# Patient Record
Sex: FEMALE | Race: OTHER | Employment: FULL TIME | URBAN - METROPOLITAN AREA
[De-identification: names, ages, dates, MRNs, and addresses within clinical notes are randomized per-mention and may not be internally consistent; named-entity substitution may affect disease eponyms.]

---

## 2020-01-27 ENCOUNTER — OFFICE VISIT (OUTPATIENT)
Dept: INTERNAL MEDICINE | Facility: CLINIC | Age: 45
End: 2020-01-27
Payer: COMMERCIAL

## 2020-01-27 VITALS
HEIGHT: 65 IN | WEIGHT: 135.13 LBS | SYSTOLIC BLOOD PRESSURE: 110 MMHG | HEART RATE: 74 BPM | BODY MASS INDEX: 22.51 KG/M2 | TEMPERATURE: 98 F | DIASTOLIC BLOOD PRESSURE: 70 MMHG

## 2020-01-27 DIAGNOSIS — Z11.3 SCREEN FOR STD (SEXUALLY TRANSMITTED DISEASE): ICD-10-CM

## 2020-01-27 DIAGNOSIS — Z29.9 PREVENTIVE MEASURE: ICD-10-CM

## 2020-01-27 DIAGNOSIS — N75.0 BARTHOLIN'S CYST: Primary | ICD-10-CM

## 2020-01-27 PROCEDURE — 99999 PR PBB SHADOW E&M-NEW PATIENT-LVL III: CPT | Mod: PBBFAC,,, | Performed by: FAMILY MEDICINE

## 2020-01-27 PROCEDURE — 3008F PR BODY MASS INDEX (BMI) DOCUMENTED: ICD-10-PCS | Mod: CPTII,S$GLB,, | Performed by: FAMILY MEDICINE

## 2020-01-27 PROCEDURE — 3008F BODY MASS INDEX DOCD: CPT | Mod: CPTII,S$GLB,, | Performed by: FAMILY MEDICINE

## 2020-01-27 PROCEDURE — 99999 PR PBB SHADOW E&M-NEW PATIENT-LVL III: ICD-10-PCS | Mod: PBBFAC,,, | Performed by: FAMILY MEDICINE

## 2020-01-27 PROCEDURE — 99203 PR OFFICE/OUTPT VISIT, NEW, LEVL III, 30-44 MIN: ICD-10-PCS | Mod: S$GLB,,, | Performed by: FAMILY MEDICINE

## 2020-01-27 PROCEDURE — 87491 CHLMYD TRACH DNA AMP PROBE: CPT

## 2020-01-27 PROCEDURE — 99203 OFFICE O/P NEW LOW 30 MIN: CPT | Mod: S$GLB,,, | Performed by: FAMILY MEDICINE

## 2020-01-27 RX ORDER — HYDROCODONE BITARTRATE AND ACETAMINOPHEN 5; 325 MG/1; MG/1
1 TABLET ORAL EVERY 4 HOURS PRN
COMMUNITY
Start: 2018-12-02 | End: 2020-06-29

## 2020-01-28 LAB
C TRACH DNA SPEC QL NAA+PROBE: NOT DETECTED
N GONORRHOEA DNA SPEC QL NAA+PROBE: NOT DETECTED

## 2020-01-29 NOTE — PROGRESS NOTES
"Subjective:      Patient ID: Dianna Tim is a 44 y.o. female.    Chief Complaint: Establish Care and Cyst    HPI  43 yo pleasant female with hx of HSV infection here to establish and says she has L sided bartholins cyst she thinks needs drained.   Had it drained about 6-7 yrs ago.   Lost insurance for a good while.  Has had swelling for a long time.  No pain at this time.  No redness/warmth.  Is concerned about it.  Not sexually active but feels she should get screened.    Past Medical History:   Diagnosis Date    HSV infection      Family History   Problem Relation Age of Onset    Diabetes Mother     Hypertension Father      Past Surgical History:   Procedure Laterality Date    EXTRACTION OF TOOTH       Social History     Tobacco Use    Smoking status: Never Smoker    Smokeless tobacco: Never Used   Substance Use Topics    Alcohol use: Never     Frequency: Never    Drug use: Never       /70   Pulse 74   Temp 98.1 °F (36.7 °C) (Oral)   Ht 5' 5" (1.651 m) Comment: per pt. unable to check ht due to hairstyle  Wt 61.3 kg (135 lb 2.3 oz)   BMI 22.49 kg/m²     Review of Systems   Constitutional: Negative for chills and fever.   Genitourinary: Negative for difficulty urinating, pelvic pain, vaginal discharge and vaginal pain.       Objective:     Physical Exam   Constitutional: She appears well-developed and well-nourished.   Cardiovascular: Normal rate, regular rhythm and normal heart sounds.   Pulmonary/Chest: Effort normal and breath sounds normal. No stridor. No respiratory distress.   Genitourinary:         Genitourinary Comments: Left labia with enlargement.  Non-tender/no redness/warmth.  No drainage   Nursing note and vitals reviewed.      No results found for: WBC, HGB, HCT, PLT, CHOL, TRIG, HDL, LDLDIRECT, ALT, AST, NA, K, CL, CREATININE, BUN, CO2, TSH, PSA, INR, GLUF, HGBA1C, MICROALBUR    Assessment:     1. Bartholin's cyst    2. Screen for STD (sexually transmitted disease)    3. " Preventive measure         Plan:     Bartholin's cyst  -     Ambulatory referral to Gynecology    Screen for STD (sexually transmitted disease)  -     HIV 1/2 Ag/Ab (4th Gen); Future; Expected date: 02/27/2020  -     C. trachomatis/N. gonorrhoeae by AMP DNA; Future; Expected date: 02/27/2020  -     Hepatitis panel, acute; Future; Expected date: 02/27/2020  -     RPR; Future; Expected date: 02/27/2020    Preventive measure  -     CBC auto differential; Future; Expected date: 02/27/2020  -     Comprehensive metabolic panel; Future; Expected date: 02/27/2020  -     Lipid panel; Future; Expected date: 02/27/2020  -     Hemoglobin A1c; Future; Expected date: 02/27/2020  -     TSH; Future; Expected date: 02/27/2020  -     Vitamin D; Future; Expected date: 02/27/2020    Send to Gyn for further eval and tx.  No symptomatic//no need for Abx.  Can try warm soaks//has not been working up to now.  Screening for STD/preventive labs  F/u for annual in 1 mos or so

## 2020-03-16 ENCOUNTER — OFFICE VISIT (OUTPATIENT)
Dept: URGENT CARE | Facility: CLINIC | Age: 45
End: 2020-03-16
Payer: COMMERCIAL

## 2020-03-16 VITALS
SYSTOLIC BLOOD PRESSURE: 114 MMHG | BODY MASS INDEX: 23.5 KG/M2 | HEART RATE: 86 BPM | WEIGHT: 141.06 LBS | TEMPERATURE: 98 F | DIASTOLIC BLOOD PRESSURE: 61 MMHG | OXYGEN SATURATION: 100 % | HEIGHT: 65 IN

## 2020-03-16 DIAGNOSIS — J06.9 URI WITH COUGH AND CONGESTION: Primary | ICD-10-CM

## 2020-03-16 PROCEDURE — 96372 THER/PROPH/DIAG INJ SC/IM: CPT | Mod: S$GLB,,, | Performed by: FAMILY MEDICINE

## 2020-03-16 PROCEDURE — 96372 PR INJECTION,THERAP/PROPH/DIAG2ST, IM OR SUBCUT: ICD-10-PCS | Mod: S$GLB,,, | Performed by: FAMILY MEDICINE

## 2020-03-16 PROCEDURE — 99214 PR OFFICE/OUTPT VISIT, EST, LEVL IV, 30-39 MIN: ICD-10-PCS | Mod: 25,S$GLB,, | Performed by: NURSE PRACTITIONER

## 2020-03-16 PROCEDURE — 99214 OFFICE O/P EST MOD 30 MIN: CPT | Mod: 25,S$GLB,, | Performed by: NURSE PRACTITIONER

## 2020-03-16 RX ORDER — BROMPHENIRAMINE MALEATE, PSEUDOEPHEDRINE HYDROCHLORIDE, AND DEXTROMETHORPHAN HYDROBROMIDE 2; 30; 10 MG/5ML; MG/5ML; MG/5ML
10 SYRUP ORAL EVERY 6 HOURS PRN
Qty: 118 ML | Refills: 0 | Status: SHIPPED | OUTPATIENT
Start: 2020-03-16 | End: 2020-03-26

## 2020-03-16 RX ORDER — BETAMETHASONE SODIUM PHOSPHATE AND BETAMETHASONE ACETATE 3; 3 MG/ML; MG/ML
6 INJECTION, SUSPENSION INTRA-ARTICULAR; INTRALESIONAL; INTRAMUSCULAR; SOFT TISSUE
Status: COMPLETED | OUTPATIENT
Start: 2020-03-16 | End: 2020-03-16

## 2020-03-16 RX ADMIN — BETAMETHASONE SODIUM PHOSPHATE AND BETAMETHASONE ACETATE 6 MG: 3; 3 INJECTION, SUSPENSION INTRA-ARTICULAR; INTRALESIONAL; INTRAMUSCULAR; SOFT TISSUE at 11:03

## 2020-03-16 NOTE — PATIENT INSTRUCTIONS
· Your symptoms are likely due to a viral infection. These infections can last up to 14 days, but you should notice some improvement of your symptoms within the first 7-10 days. Viral infections will not improve with antibiotics. If your symptoms persist >10 days without improvement or if you have any new or worsening symptoms, this is an indication that you may have developed a bacterial infection and should return to your primary care provider or to Urgent Care.   · Getting plenty of rest is very important to fighting infections.  · Increase fluids.   · May apply warm compresses as needed for facial pain and congestion.   · Saline nasal spray to loosen nasal congestion.  · Flonase or Nasacort to reduce inflammation in the sinus cavities.  · You may take an over the counter antihistamine such as Claritin or Zyrtec for allergy symptoms such as sneezing, itchy/watery eyes, scratchy throat, or congestion.  · Warm salt water gargles, Cepacol throat lozenges, or Chloraseptic spray for sore throat.  · Take Tylenol or Ibuprofen as needed for sore throat, body aches, or fever.  · Don't drive, drink alcohol, or take any other medication or substance that causes sedation while taking cough syrup.   · Follow up with your primary care provider if symptoms persist >10 days or sooner for any new or worsening symptoms.   · Go to the ER for any fever that does not improve with Tylenol/Ibuprofen, neck stiffness, rash, severe headache, vision changes, shortness of breath, chest pain, facial swelling, severe facial pain, or any other new and concerning symptoms.

## 2020-03-16 NOTE — PROGRESS NOTES
"Subjective:       Patient ID: Dianna Tim is a 44 y.o. female.    Vitals:  height is 5' 5" (1.651 m) and weight is 64 kg (141 lb 0.8 oz). Her tympanic temperature is 98.3 °F (36.8 °C). Her blood pressure is 114/61 and her pulse is 86. Her oxygen saturation is 100%.     Chief Complaint: Sinus Problem    Patient states she has had symptoms for about 5 days and is just ready "to be done with this". 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 Denies fever, chills, body aches and shortness of breath.    Sinus Problem   This is a new problem. The current episode started in the past 7 days. The problem is unchanged. There has been no fever. Her pain is at a severity of 0/10. She is experiencing no pain. Associated symptoms include congestion (nasal), coughing (mostly dry, occasional sputum), sinus pressure and sneezing. Pertinent negatives include no chills, diaphoresis, ear pain, headaches, hoarse voice, shortness of breath or sore throat. Past treatments include nothing. The treatment provided no relief.       Constitution: Negative for chills, sweating, fatigue and fever.   HENT: Positive for congestion (nasal), postnasal drip and sinus pressure. Negative for ear pain, sinus pain, sore throat and voice change.    Neck: Negative for painful lymph nodes.   Eyes: Negative for eye redness.   Respiratory: Positive for cough (mostly dry, occasional sputum). Negative for chest tightness, sputum production, bloody sputum, COPD, shortness of breath, stridor, wheezing and asthma.    Gastrointestinal: Negative for nausea and vomiting.   Musculoskeletal: Negative for muscle ache.   Skin: Negative for rash.   Allergic/Immunologic: Positive for sneezing. Negative for seasonal allergies and asthma.   Neurological: Negative for headaches.   Hematologic/Lymphatic: Negative for swollen lymph nodes.       Objective:      Physical Exam   Constitutional: She is oriented to person, place, and " time. She appears well-developed and well-nourished. She is cooperative.  Non-toxic appearance. She does not have a sickly appearance. She does not appear ill. No distress.   HENT:   Head: Normocephalic and atraumatic.   Right Ear: Hearing, external ear and ear canal normal. Tympanic membrane is not erythematous. A middle ear effusion is present.   Left Ear: Hearing, external ear and ear canal normal. Tympanic membrane is not erythematous. A middle ear effusion is present.   Nose: Rhinorrhea present. No mucosal edema or nasal deformity. No epistaxis. Right sinus exhibits no maxillary sinus tenderness and no frontal sinus tenderness. Left sinus exhibits no maxillary sinus tenderness and no frontal sinus tenderness.   Mouth/Throat: Uvula is midline, oropharynx is clear and moist and mucous membranes are normal. No trismus in the jaw. Normal dentition. No uvula swelling. Cobblestoning present. No oropharyngeal exudate, posterior oropharyngeal edema or posterior oropharyngeal erythema.   Eyes: Conjunctivae and lids are normal. No scleral icterus.   Neck: Trachea normal, full passive range of motion without pain and phonation normal. Neck supple. No neck rigidity. No edema and no erythema present.   Cardiovascular: Normal rate, regular rhythm, normal heart sounds, intact distal pulses and normal pulses.   Pulmonary/Chest: Effort normal and breath sounds normal. No respiratory distress. She has no decreased breath sounds. She has no rhonchi.   Abdominal: Normal appearance.   Musculoskeletal: Normal range of motion. She exhibits no edema or deformity.   Neurological: She is alert and oriented to person, place, and time. She exhibits normal muscle tone. Coordination normal.   Skin: Skin is warm, dry, intact, not diaphoretic and not pale.   Psychiatric: She has a normal mood and affect. Her speech is normal and behavior is normal. Judgment and thought content normal. Cognition and memory are normal.   Nursing note and vitals  reviewed.        Assessment:       1. URI with cough and congestion        Plan:         URI with cough and congestion  -     betamethasone acetate-betamethasone sodium phosphate injection 6 mg  -     brompheniramine-pseudoeph-DM (BROMFED DM) 2-30-10 mg/5 mL Syrp; Take 10 mLs by mouth every 6 (six) hours as needed (cough and congestion).  Dispense: 118 mL; Refill: 0         Pt requests a steroid injection. He has had them in the past (last was at least 1 year ago) with good relief of symptoms and no adverse effects. Offered course of oral steroids in place of injection due to safer side effect profile and ability to discontinue medication if any adverse effects are noted, however, patient prefers injection. Patient informed of potential side effects of steroid injection including elevating blood pressure, increased blood glucose levels, increased risk of opportunistic infections, peptic ulcer disease and GI bleeding, insomnia, tremors, suppression of ones own steroid production, avascular necrosis, osteoporosis, increased intraocular pressure, etc. Patient verbalizes risk/benefit profile and still requests steroid injection.     · Your symptoms are likely due to a viral infection. These infections can last up to 14 days, but you should notice some improvement of your symptoms within the first 7-10 days. Viral infections will not improve with antibiotics. If your symptoms persist >10 days without improvement or if you have any new or worsening symptoms, this is an indication that you may have developed a bacterial infection and should return to your primary care provider or to Urgent Care.   · Getting plenty of rest is very important to fighting infections.  · Increase fluids.   · May apply warm compresses as needed for facial pain and congestion.   · Saline nasal spray to loosen nasal congestion.  · Flonase or Nasacort to reduce inflammation in the sinus cavities.  · You may take an over the counter antihistamine  such as Claritin or Zyrtec for allergy symptoms such as sneezing, itchy/watery eyes, scratchy throat, or congestion.  · Warm salt water gargles, Cepacol throat lozenges, or Chloraseptic spray for sore throat.  · Take Tylenol or Ibuprofen as needed for sore throat, body aches, or fever.  · Don't drive, drink alcohol, or take any other medication or substance that causes sedation while taking cough syrup.   · Follow up with your primary care provider if symptoms persist >10 days or sooner for any new or worsening symptoms.   · Go to the ER for any fever that does not improve with Tylenol/Ibuprofen, neck stiffness, rash, severe headache, vision changes, shortness of breath, chest pain, facial swelling, severe facial pain, or any other new and concerning symptoms.

## 2020-03-18 ENCOUNTER — TELEPHONE (OUTPATIENT)
Dept: URGENT CARE | Facility: CLINIC | Age: 45
End: 2020-03-18

## 2020-06-22 ENCOUNTER — OFFICE VISIT (OUTPATIENT)
Dept: OBSTETRICS AND GYNECOLOGY | Facility: CLINIC | Age: 45
End: 2020-06-22
Payer: COMMERCIAL

## 2020-06-22 ENCOUNTER — OFFICE VISIT (OUTPATIENT)
Dept: URGENT CARE | Facility: CLINIC | Age: 45
End: 2020-06-22
Payer: COMMERCIAL

## 2020-06-22 VITALS
BODY MASS INDEX: 26.93 KG/M2 | SYSTOLIC BLOOD PRESSURE: 130 MMHG | DIASTOLIC BLOOD PRESSURE: 86 MMHG | HEIGHT: 65 IN | WEIGHT: 161.63 LBS

## 2020-06-22 VITALS
TEMPERATURE: 98 F | DIASTOLIC BLOOD PRESSURE: 70 MMHG | HEART RATE: 85 BPM | SYSTOLIC BLOOD PRESSURE: 115 MMHG | WEIGHT: 135 LBS | BODY MASS INDEX: 22.47 KG/M2

## 2020-06-22 DIAGNOSIS — N94.89 LABIAL PAIN: ICD-10-CM

## 2020-06-22 DIAGNOSIS — N75.0 BARTHOLIN CYST: Primary | ICD-10-CM

## 2020-06-22 DIAGNOSIS — N75.0 BARTHOLIN'S CYST: Primary | ICD-10-CM

## 2020-06-22 LAB
BILIRUB UR QL STRIP: NEGATIVE
GLUCOSE UR QL STRIP: NEGATIVE
KETONES UR QL STRIP: NEGATIVE
LEUKOCYTE ESTERASE UR QL STRIP: POSITIVE
PH, POC UA: 5
POC BLOOD, URINE: NEGATIVE
POC NITRATES, URINE: NEGATIVE
PROT UR QL STRIP: NEGATIVE
SP GR UR STRIP: 1 (ref 1–1.03)
UROBILINOGEN UR STRIP-ACNC: NORMAL (ref 0.1–1.1)

## 2020-06-22 PROCEDURE — 81003 POCT URINALYSIS, DIPSTICK, AUTOMATED, W/O SCOPE: ICD-10-PCS | Mod: QW,S$GLB,, | Performed by: NURSE PRACTITIONER

## 2020-06-22 PROCEDURE — 56420 PR I&D BARTHOLIN GLAND ABSCESS: ICD-10-PCS | Mod: S$GLB,,, | Performed by: OBSTETRICS & GYNECOLOGY

## 2020-06-22 PROCEDURE — 99999 PR PBB SHADOW E&M-EST. PATIENT-LVL III: ICD-10-PCS | Mod: PBBFAC,,, | Performed by: OBSTETRICS & GYNECOLOGY

## 2020-06-22 PROCEDURE — 3008F BODY MASS INDEX DOCD: CPT | Mod: CPTII,S$GLB,, | Performed by: OBSTETRICS & GYNECOLOGY

## 2020-06-22 PROCEDURE — 99999 PR PBB SHADOW E&M-EST. PATIENT-LVL III: CPT | Mod: PBBFAC,,, | Performed by: OBSTETRICS & GYNECOLOGY

## 2020-06-22 PROCEDURE — 99203 OFFICE O/P NEW LOW 30 MIN: CPT | Mod: 25,S$GLB,, | Performed by: OBSTETRICS & GYNECOLOGY

## 2020-06-22 PROCEDURE — 3008F PR BODY MASS INDEX (BMI) DOCUMENTED: ICD-10-PCS | Mod: CPTII,S$GLB,, | Performed by: OBSTETRICS & GYNECOLOGY

## 2020-06-22 PROCEDURE — 99214 PR OFFICE/OUTPT VISIT, EST, LEVL IV, 30-39 MIN: ICD-10-PCS | Mod: 25,S$GLB,, | Performed by: NURSE PRACTITIONER

## 2020-06-22 PROCEDURE — 81003 URINALYSIS AUTO W/O SCOPE: CPT | Mod: QW,S$GLB,, | Performed by: NURSE PRACTITIONER

## 2020-06-22 PROCEDURE — 99203 PR OFFICE/OUTPT VISIT, NEW, LEVL III, 30-44 MIN: ICD-10-PCS | Mod: 25,S$GLB,, | Performed by: OBSTETRICS & GYNECOLOGY

## 2020-06-22 PROCEDURE — 56420 I&D BARTHOLINS GLAND ABSCESS: CPT | Mod: S$GLB,,, | Performed by: OBSTETRICS & GYNECOLOGY

## 2020-06-22 PROCEDURE — 99214 OFFICE O/P EST MOD 30 MIN: CPT | Mod: 25,S$GLB,, | Performed by: NURSE PRACTITIONER

## 2020-06-22 RX ORDER — SULFAMETHOXAZOLE AND TRIMETHOPRIM 800; 160 MG/1; MG/1
1 TABLET ORAL 2 TIMES DAILY
Qty: 14 TABLET | Refills: 0 | Status: SHIPPED | OUTPATIENT
Start: 2020-06-22 | End: 2020-06-22

## 2020-06-22 NOTE — PATIENT INSTRUCTIONS
Understanding Bartholin Cyst and Abscess    A woman has two Bartholin glands. They are located on the sides of the vaginal opening. These pea-sized glands make mucus. This mucus lubricates the outside part of the vagina (vulva). If a tube (duct) in one of these glands becomes blocked, it can cause a cyst or abscess.  What causes a Bartholin cyst or abscess?  A cyst can form when the duct of a Bartholin gland becomes blocked. The mucus cant come out of the gland. It builds up. If the cyst becomes infected, it may turn into an abscess.  A blockage in the gland can occur from an injury or an infection. It may also be linked to a sexually transmitted disease.  Symptoms of a Bartholin cyst or abscess  A Bartholin cyst starts as a small bump. It may cause no other symptoms. Or it may grow bigger. It can then cause swelling and pain. If an abscess forms, it can be very painful. You may have trouble walking, sitting, or having sex.  Treatment for a Bartholin cyst or abscess  A cyst that doesnt cause any symptoms may not need to be treated. It may go away on its own. But if you feel discomfort or pain, treatment options include:  · Medicine. Over-the-counter pain medicines can help. In some cases, you may need antibiotics if an infection is severe or a cyst or abscess comes back.  · Sitz bath. Soaking the genital area in warm water can ease pain.  · Drainage. Cutting open the cyst allows the fluid inside it to drain. This eases discomfort and pain. The doctor may insert a tube (catheter) to help with drainage. This catheter may need to stay in place for up to 3 weeks.  · Surgery. You may need to have the Bartholin glands removed if other treatments dont work.  When to call your healthcare provider  Call your healthcare provider right away if you have any of these:  · Fever of 100.4°F (38°C) or higher, or as directed  · Redness, swelling, or fluid leaking from the cyst that gets worse  · Pain that gets worse  · Symptoms  that dont get better, or get worse  · New symptoms   Date Last Reviewed: 6/1/2016  © 5632-6829 [employer]. [address] [place] [address], [place] [address]. All rights reserved. This information is not intended as a substitute for professional medical care. Always follow your healthcare professional's instructions.

## 2020-06-22 NOTE — PROGRESS NOTES
Subjective:       Patient ID: Dianna Tim is a 44 y.o. female.    Chief Complaint:  Bartholin's Cyst      History of Present Illness  HPI  Left labial swelling several days  History of left Bartholin Cyst drained with no catheter placement years ago   No other Pelvic complaints   Seen by urgent care today, referred and started on Bactrim.  Advised to not fill or take     Health Maintenance   Topic Date Due    Lipid Panel  1973    TETANUS VACCINE  12/09/1991    Mammogram  12/09/2013     GYN & OB History  Patient's last menstrual period was 12/01/2019.   Date of Last Pap: No result found    OB History   No obstetric history on file.       Review of Systems  Review of Systems        Objective:   Physical Exam:               Genitourinary:    Pelvic exam was performed with patient supine.   Labial bartholins abnormal.   Genitourinary Comments: 5 cm non inflamed Bartholin cyst on the left                        Discussed word Cath placement to include complications of pain and bleeding     Betadine cleansing of the left vulva  Injection with 1 % plain lidocaine, 2 cc. Over the cyst   Incision with knife over the inner border of the cyst with drainage of 25 cc milky non foul fluid   Palpation of the base of the cyst shows no nodularity or mass   Word cath placed with 3 cc saline in the balloon      Assessment:        1. Bartholin cyst                Plan:            Dianna was seen today for bartholin's cyst.    Diagnoses and all orders for this visit:    Bartholin cyst      Follow up in one week   Warm soaks nightly   Tylenol for pain   Hold the Bactrim

## 2020-06-22 NOTE — LETTER
June 22, 2020      Collin Chacon, ESTEBAN  [address]  [place] [address]           [facility]  [address]  [place] [address]  Phone: [phone]  Fax: [phone]          Patient: Dianna Tim   MR Number: [id number]   YOB: 1975   Date of Visit: 6/22/2020       Dear Collin Chacon:    Thank you for referring Dianna Tim to me for evaluation. Attached you will find relevant portions of my assessment and plan of care.    If you have questions, please do not hesitate to call me. I look forward to following Dianna Tim along with you.    Sincerely,    CARLIN Dai MD    Enclosure  CC:  No Recipients    If you would like to receive this communication electronically, please contact [email or web address] or [phone] to request more information on [facility] [employer] access.    For providers and/or their staff who would like to refer a patient to [facility], please contact us through our one-stop-shop provider referral line, [facility], at [phone].    If you feel you have received this communication in error or would no longer like to receive these types of communications, please e-mail [email or web address]

## 2020-06-22 NOTE — PROGRESS NOTES
Subjective:       Patient ID: Dianna Tim is a 44 y.o. female.    Vitals:  weight is 61.2 kg (135 lb). Her temperature is 97.7 °F (36.5 °C). Her blood pressure is 115/70 and her pulse is 85.     Chief Complaint: Cyst    Pt states she has been having an issue with her Bartholin's cyst for the past couple of months. Flared up and became a problem 2 days ago. Pt did have a GYN appt she missed pre-covid. Has been taking hot baths to help with symptoms.    Cyst  This is a new problem. The current episode started more than 1 year ago. The problem occurs intermittently. The problem has been gradually worsening. Pertinent negatives include no abdominal pain, arthralgias, change in bowel habit, chest pain, chills, congestion, coughing, fatigue, fever, headaches, joint swelling, myalgias, nausea, rash, sore throat, vertigo, vomiting or weakness. Nothing aggravates the symptoms. She has tried nothing for the symptoms.       Constitution: Negative for chills, fatigue and fever.   HENT: Negative for congestion and sore throat.    Neck: Negative for painful lymph nodes.   Cardiovascular: Negative for chest pain and leg swelling.   Eyes: Negative for double vision and blurred vision.   Respiratory: Negative for cough and shortness of breath.    Gastrointestinal: Negative for abdominal pain, nausea, vomiting and diarrhea.   Genitourinary: Negative for dysuria, frequency, urgency and history of kidney stones.   Musculoskeletal: Positive for pain. Negative for joint pain, joint swelling, muscle cramps and muscle ache.   Skin: Negative for color change, pale, rash and bruising.   Allergic/Immunologic: Negative for seasonal allergies.   Neurological: Negative for dizziness, history of vertigo, light-headedness, passing out and headaches.   Hematologic/Lymphatic: Negative for swollen lymph nodes.   Psychiatric/Behavioral: Negative for nervous/anxious, sleep disturbance and depression. The patient is not nervous/anxious.         Objective:      Physical Exam   Constitutional: She is oriented to person, place, and time. She appears well-developed.   HENT:   Head: Normocephalic and atraumatic.   Right Ear: External ear normal.   Left Ear: External ear normal.   Nose: Nose normal. No nasal deformity. No epistaxis.   Mouth/Throat: Oropharynx is clear and moist and mucous membranes are normal.   Eyes: Lids are normal.   Neck: Trachea normal, normal range of motion and phonation normal. Neck supple.   Cardiovascular: Normal pulses.   Pulmonary/Chest: Effort normal.   Abdominal: Soft. Normal appearance and bowel sounds are normal. She exhibits no distension. There is no abdominal tenderness.   Genitourinary: There is tenderness on the left labia.    Genitourinary Comments: Left labial swelling noted.     Neurological: She is alert and oriented to person, place, and time.   Skin: Skin is warm, dry and intact.   Psychiatric: Her speech is normal and behavior is normal.   Nursing note and vitals reviewed.        Assessment:       1. Bartholin's cyst    2. Labial pain        Plan:         Bartholin's cyst  -     sulfamethoxazole-trimethoprim 800-160mg (BACTRIM DS) 800-160 mg Tab; Take 1 tablet by mouth 2 (two) times daily. for 7 days  Dispense: 14 tablet; Refill: 0  -     Ambulatory referral/consult to Gynecology    Labial pain  -     POCT Urinalysis, Dipstick, Automated, W/O Scope          Results for orders placed or performed in visit on 06/22/20   POCT Urinalysis, Dipstick, Automated, W/O Scope   Result Value Ref Range    POC Blood, Urine Negative Negative    POC Bilirubin, Urine Negative Negative    POC Urobilinogen, Urine normal 0.1 - 1.1    POC Ketones, Urine Negative Negative    POC Protein, Urine Negative Negative    POC Nitrates, Urine Negative Negative    POC Glucose, Urine Negative Negative    pH, UA 5.0     POC Specific Gravity, Urine 1.005 1.003 - 1.029    POC Leukocytes, Urine Positive (A) Negative     Lab results reviewed.    GYN  referral placed.  Appt scheduled for today with GYN.  Take antibiotics as prescribed.  Follow up with GYN today.

## 2020-06-23 ENCOUNTER — TELEPHONE (OUTPATIENT)
Dept: URGENT CARE | Facility: CLINIC | Age: 45
End: 2020-06-23

## 2020-06-23 NOTE — TELEPHONE ENCOUNTER
Patient called urgent care asking for more information about her urinalysis results.  Explained that the urinalysis showed a small amount of leukocytes, which is the type of cell that responds to infection.  Explained to patient that if she does have a low grade infection in her urine, then she has been placed by the provider on an antibiotic that would treat this infection.  She expressed understanding.    Destiney Wheeler PA-C   Physician Assistant   [facility]

## 2020-06-24 ENCOUNTER — TELEPHONE (OUTPATIENT)
Dept: URGENT CARE | Facility: CLINIC | Age: 45
End: 2020-06-24

## 2020-06-29 ENCOUNTER — OFFICE VISIT (OUTPATIENT)
Dept: OBSTETRICS AND GYNECOLOGY | Facility: CLINIC | Age: 45
End: 2020-06-29
Payer: COMMERCIAL

## 2020-06-29 VITALS
WEIGHT: 155.19 LBS | HEIGHT: 65 IN | SYSTOLIC BLOOD PRESSURE: 132 MMHG | BODY MASS INDEX: 25.85 KG/M2 | DIASTOLIC BLOOD PRESSURE: 78 MMHG

## 2020-06-29 DIAGNOSIS — N75.0 BARTHOLIN CYST: Primary | ICD-10-CM

## 2020-06-29 PROCEDURE — 3008F BODY MASS INDEX DOCD: CPT | Mod: CPTII,S$GLB,, | Performed by: OBSTETRICS & GYNECOLOGY

## 2020-06-29 PROCEDURE — 99024 PR POST-OP FOLLOW-UP VISIT: ICD-10-PCS | Mod: S$GLB,,, | Performed by: OBSTETRICS & GYNECOLOGY

## 2020-06-29 PROCEDURE — 99999 PR PBB SHADOW E&M-EST. PATIENT-LVL II: ICD-10-PCS | Mod: PBBFAC,,, | Performed by: OBSTETRICS & GYNECOLOGY

## 2020-06-29 PROCEDURE — 3008F PR BODY MASS INDEX (BMI) DOCUMENTED: ICD-10-PCS | Mod: CPTII,S$GLB,, | Performed by: OBSTETRICS & GYNECOLOGY

## 2020-06-29 PROCEDURE — 99999 PR PBB SHADOW E&M-EST. PATIENT-LVL II: CPT | Mod: PBBFAC,,, | Performed by: OBSTETRICS & GYNECOLOGY

## 2020-06-29 PROCEDURE — 99024 POSTOP FOLLOW-UP VISIT: CPT | Mod: S$GLB,,, | Performed by: OBSTETRICS & GYNECOLOGY

## 2020-06-29 RX ORDER — SULFAMETHOXAZOLE AND TRIMETHOPRIM 800; 160 MG/1; MG/1
TABLET ORAL
COMMUNITY
Start: 2020-06-22 | End: 2020-06-29

## 2020-06-29 NOTE — PROGRESS NOTES
Subjective:       Patient ID: Dianna Tim is a 44 y.o. female.    Chief Complaint:  Bartholin's Cyst (follow-up )      History of Present Illness  HPI  Follow up for Word Cath placement   Symptoms have improved. Cath still in place   Health Maintenance   Topic Date Due    Lipid Panel  1973    TETANUS VACCINE  1991    Mammogram  2013     GYN & OB History  No LMP recorded (lmp unknown). Patient is perimenopausal.   Date of Last Pap: No result found    OB History    Para Term  AB Living   2       2 0   SAB TAB Ectopic Multiple Live Births     2            # Outcome Date GA Lbr Miguel/2nd Weight Sex Delivery Anes PTL Lv   2 TAB            1 TAB                Review of Systems  Review of Systems        Objective:   Physical Exam   Word Catheter removed with no difficulty, Good opening noted with epithelialization   Assessment:        1. Bartholin cyst                Plan:            Dianna was seen today for bartholin's cyst.    Diagnoses and all orders for this visit:    Bartholin cyst  Comments:  Draining well with good epith at the site of the incision

## 2020-08-13 ENCOUNTER — TELEPHONE (OUTPATIENT)
Dept: INTERNAL MEDICINE | Facility: CLINIC | Age: 45
End: 2020-08-13

## 2020-08-13 RX ORDER — VALACYCLOVIR HYDROCHLORIDE 1 G/1
1000 TABLET, FILM COATED ORAL 2 TIMES DAILY
Qty: 14 TABLET | Refills: 0 | Status: SHIPPED | OUTPATIENT
Start: 2020-08-13 | End: 2020-08-20

## 2020-08-13 NOTE — TELEPHONE ENCOUNTER
Called pt and she saw you in Jan and she said she did STD testing and she discussed with you that she had herpes.  She is having a flare and needs Valtrex sent in to [facility] in [place].

## 2020-08-13 NOTE — TELEPHONE ENCOUNTER
----- Message from Connie Casillas sent at 8/13/2020  9:59 AM CDT -----  Contact: pt  Pt requesting a call back. She would like to get a prescription. She did not want to give the medication name over the phone. Please call pt back at [phone]        [address] [address] [facility] [id number] [place] [id number]  [facility] [address]  Phone: [phone] Fax: [phone]